# Patient Record
Sex: FEMALE | URBAN - METROPOLITAN AREA
[De-identification: names, ages, dates, MRNs, and addresses within clinical notes are randomized per-mention and may not be internally consistent; named-entity substitution may affect disease eponyms.]

---

## 2019-07-05 ENCOUNTER — APPOINTMENT (RX ONLY)
Dept: URBAN - METROPOLITAN AREA CLINIC 15 | Facility: CLINIC | Age: 54
Setting detail: DERMATOLOGY
End: 2019-07-05

## 2019-07-05 DIAGNOSIS — Z41.9 ENCOUNTER FOR PROCEDURE FOR PURPOSES OTHER THAN REMEDYING HEALTH STATE, UNSPECIFIED: ICD-10-CM

## 2019-07-05 PROCEDURE — ? COSMETIC CONSULTATION - KYBELLA

## 2019-07-05 PROCEDURE — ? FILLERS

## 2019-07-05 PROCEDURE — ? MEDICAL CONSULTATION: FILLERS

## 2019-07-05 ASSESSMENT — LOCATION DETAILED DESCRIPTION DERM
LOCATION DETAILED: LEFT SUPERIOR LATERAL MALAR CHEEK
LOCATION DETAILED: RIGHT CENTRAL MALAR CHEEK
LOCATION DETAILED: RIGHT NASOLABIAL FOLD
LOCATION DETAILED: LEFT INFERIOR MEDIAL MALAR CHEEK
LOCATION DETAILED: RIGHT LATERAL BUCCAL CHEEK
LOCATION DETAILED: LEFT CENTRAL BUCCAL CHEEK
LOCATION DETAILED: LEFT MEDIAL MALAR CHEEK
LOCATION DETAILED: LEFT CENTRAL MALAR CHEEK
LOCATION DETAILED: RIGHT MEDIAL BUCCAL CHEEK
LOCATION DETAILED: LEFT LATERAL BUCCAL CHEEK
LOCATION DETAILED: LEFT CENTRAL SUBMANDIBULAR CHEEK
LOCATION DETAILED: RIGHT SUPERIOR LATERAL MALAR CHEEK

## 2019-07-05 ASSESSMENT — LOCATION SIMPLE DESCRIPTION DERM
LOCATION SIMPLE: LEFT CHEEK
LOCATION SIMPLE: RIGHT LIP
LOCATION SIMPLE: RIGHT CHEEK

## 2019-07-05 ASSESSMENT — LOCATION ZONE DERM
LOCATION ZONE: FACE
LOCATION ZONE: LIP

## 2019-07-05 NOTE — PROCEDURE: COSMETIC CONSULTATION - [facility]LL[facility]
# Of Treatments Recommended (Won't Render If 0): 0
Vials Recommended (Won't Render If 0): 2
Price (Use Numbers Only, No Special Characters Or $): 2 vials - $600 each vial
Detail Level: Detailed

## 2019-07-05 NOTE — PROCEDURE: FILLERS
Tear Troughs Filler  Volume In Cc: 0
Expiration Date (Month Year): 2020-6-30
Include Cannula Information In Note?: No
Additional Anesthesia Volume In Cc: 6
Additional Area 1 Location: lips
Additional Area 2 Location: left Nasolabial folds
Filler: Marybeth Arnett
Filler: Restylane Refyne
Consent: Written consent obtained. Risks include but not limited to bruising, beading, irregular texture, ulceration, infection, allergic reaction, scar formation, incomplete augmentation, temporary nature, procedural pain.
Post-Care Instructions: Patient instructed to apply ice to reduce swelling.
Jawline Filler Volume In Cc: 1
Lot #: [id number]
Expiration Date (Month Year): 2020-12-31
Anesthesia Type: 1% lidocaine with epinephrine
Detail Level: Detailed
Include Cannula Information In Note?: Yes
Map Statment: See [address] for Complete Details
Lot #: [id number]
Anesthesia Volume In Cc: 0.5